# Patient Record
Sex: MALE | Race: AMERICAN INDIAN OR ALASKA NATIVE | NOT HISPANIC OR LATINO
[De-identification: names, ages, dates, MRNs, and addresses within clinical notes are randomized per-mention and may not be internally consistent; named-entity substitution may affect disease eponyms.]

---
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
## 2018-08-30 ENCOUNTER — APPOINTMENT (OUTPATIENT)
Dept: PEDIATRICS | Facility: HOSPITAL | Age: 18
End: 2018-08-30

## 2018-09-24 PROBLEM — J45.909 UNSPECIFIED ASTHMA, UNCOMPLICATED: Chronic | Status: ACTIVE | Noted: 2017-06-06

## 2018-09-24 PROBLEM — F90.9 ATTENTION-DEFICIT HYPERACTIVITY DISORDER, UNSPECIFIED TYPE: Chronic | Status: ACTIVE | Noted: 2017-06-06

## 2018-10-04 ENCOUNTER — APPOINTMENT (OUTPATIENT)
Dept: PEDIATRICS | Facility: HOSPITAL | Age: 18
End: 2018-10-04
Payer: MEDICAID

## 2018-10-04 ENCOUNTER — OUTPATIENT (OUTPATIENT)
Dept: OUTPATIENT SERVICES | Age: 18
LOS: 1 days | End: 2018-10-04

## 2018-10-04 ENCOUNTER — LABORATORY RESULT (OUTPATIENT)
Age: 18
End: 2018-10-04

## 2018-10-04 VITALS
DIASTOLIC BLOOD PRESSURE: 77 MMHG | SYSTOLIC BLOOD PRESSURE: 116 MMHG | HEIGHT: 67.13 IN | HEART RATE: 62 BPM | BODY MASS INDEX: 25.91 KG/M2 | WEIGHT: 167 LBS

## 2018-10-04 DIAGNOSIS — Z72.821 INADEQUATE SLEEP HYGIENE: ICD-10-CM

## 2018-10-04 DIAGNOSIS — Z86.19 PERSONAL HISTORY OF OTHER INFECTIOUS AND PARASITIC DISEASES: ICD-10-CM

## 2018-10-04 DIAGNOSIS — J45.30 MILD PERSISTENT ASTHMA, UNCOMPLICATED: ICD-10-CM

## 2018-10-04 DIAGNOSIS — J45.50 SEVERE PERSISTENT ASTHMA, UNCOMPLICATED: ICD-10-CM

## 2018-10-04 DIAGNOSIS — F90.9 ATTENTION-DEFICIT HYPERACTIVITY DISORDER, UNSPECIFIED TYPE: ICD-10-CM

## 2018-10-04 DIAGNOSIS — J30.89 OTHER ALLERGIC RHINITIS: ICD-10-CM

## 2018-10-04 DIAGNOSIS — Z00.00 ENCOUNTER FOR GENERAL ADULT MEDICAL EXAMINATION W/OUT ABNORMAL FINDINGS: ICD-10-CM

## 2018-10-04 PROCEDURE — 99395 PREV VISIT EST AGE 18-39: CPT | Mod: 25

## 2018-10-04 PROCEDURE — 99213 OFFICE O/P EST LOW 20 MIN: CPT

## 2018-10-04 RX ORDER — CLOTRIMAZOLE 10 MG/G
1 CREAM TOPICAL 3 TIMES DAILY
Qty: 1 | Refills: 0 | Status: COMPLETED | COMMUNITY
Start: 2017-08-29 | End: 2018-10-04

## 2018-10-04 RX ORDER — SELENIUM SULFIDE 25 MG/ML
2.5 LOTION TOPICAL
Qty: 14 | Refills: 0 | Status: COMPLETED | COMMUNITY
Start: 2017-08-29 | End: 2018-10-04

## 2018-10-04 NOTE — PHYSICAL EXAM
[General Appearance - Well Developed] : interactive [General Appearance - Well-Appearing] : well appearing [General Appearance - In No Acute Distress] : in no acute distress [Appearance Of Head] : the head was normocephalic [Sclera] : the conjunctiva were normal [Outer Ear] : the ears and nose were normal in appearance [Both Tympanic Membranes Were Examined] : both tympanic membranes were normal [Nasal Cavity] : the nasal mucosa and septum were normal [Examination Of The Oral Cavity] : the teeth, gums, and palate were normal [Oropharynx] : the oropharynx was normal  [Neck Cervical Mass (___cm)] : no neck mass was observed [Respiration, Rhythm And Depth] : normal respiratory rhythm and effort [Auscultation Breath Sounds / Voice Sounds] : clear bilateral breath sounds [Heart Rate And Rhythm] : heart rate and rhythm were normal [Heart Sounds] : normal S1 and S2 [Murmurs] : no murmurs [Bowel Sounds] : normal bowel sounds [Abdomen Soft] : soft [Abdomen Tenderness] : non-tender [Abdominal Distention] : nondistended [Musculoskeletal Exam: Normal Movement Of All Extremities] : normal movements of all extremities [Motor Tone] : muscle strength and tone were normal [No Visual Abnormalities] : no visible abnormailities [Deep Tendon Reflexes (DTR)] : deep tendon reflexes were 2+ and symmetric [Generalized Lymph Node Enlargement] : no lymphadenopathy [Skin Color & Pigmentation] : normal skin color and pigmentation [] : no significant rash [Skin Lesions] : no skin lesions [Initial Inspection: Infant Active And Alert] : active and alert [Penis Abnormality] : the penis was normal [Scrotum] : the scrotum was normal [Testes Mass (___cm)] : there were no testicular masses

## 2018-10-08 ENCOUNTER — OUTPATIENT (OUTPATIENT)
Dept: OUTPATIENT SERVICES | Age: 18
LOS: 1 days | End: 2018-10-08

## 2018-10-08 ENCOUNTER — LABORATORY RESULT (OUTPATIENT)
Age: 18
End: 2018-10-08

## 2018-10-08 ENCOUNTER — APPOINTMENT (OUTPATIENT)
Dept: PEDIATRICS | Facility: HOSPITAL | Age: 18
End: 2018-10-08
Payer: MEDICAID

## 2018-10-08 DIAGNOSIS — Z11.1 ENCOUNTER FOR SCREENING FOR RESPIRATORY TUBERCULOSIS: ICD-10-CM

## 2018-10-08 DIAGNOSIS — Z23 ENCOUNTER FOR IMMUNIZATION: ICD-10-CM

## 2018-10-08 PROBLEM — Z72.821 POOR SLEEP HYGIENE: Status: ACTIVE | Noted: 2018-10-08

## 2018-10-08 LAB
AMPHET UR-MCNC: NEGATIVE
BARBITURATES UR-MCNC: NEGATIVE
BENZODIAZ UR-MCNC: NEGATIVE
COCAINE METAB.OTHER UR-MCNC: NEGATIVE
CREATININE, URINE: 104.6 MG/DL
METHADONE UR-MCNC: NEGATIVE
METHAQUALONE UR-MCNC: NEGATIVE
MEV IGG FLD QL IA: <5 AU/ML
MEV IGG+IGM SER-IMP: NEGATIVE
OPIATES UR-MCNC: NEGATIVE
PCP UR-MCNC: NEGATIVE
PROPOXYPH UR QL: NEGATIVE
RUBV IGG FLD-ACNC: 2.7 INDEX
RUBV IGG SER-IMP: POSITIVE
THC UR QL: NEGATIVE

## 2018-10-08 PROCEDURE — 99211 OFF/OP EST MAY X REQ PHY/QHP: CPT

## 2018-10-08 NOTE — HISTORY OF PRESENT ILLNESS
[FreeTextEntry6] : LUKE CANTRELL is a 17 year old who was seen last week for WCC.\par In the interim, quantiferon gold was clotted.  He also showed no antibodies present for measles

## 2018-10-08 NOTE — DISCUSSION/SUMMARY
[Normal Growth] : growth [Normal Development] : development  [None] : No known medical problems [No Elimination Concerns] : elimination [No Feeding Concerns] : feeding [No Skin Concerns] : skin [Normal Sleep Pattern] : sleep [Physical Growth and Development] : physical growth and development [Social and Academic Competence] : social and academic competence [Emotional Well-Being] : emotional well-being [Risk Reduction] : risk reduction [Violence and Injury Prevention] : violence and injury prevention [No Medications] : ~He/She~ is not on any medications [Patient] : patient [Parent/Guardian] : parent/guardian [Met privately with the adolescent for part of the office visit?] : Met privately with the adolescent for part of the office visit? Yes [Adolescent demonstrates understanding of his/her conditions and how to take prescribed medications?] : Adolescent demonstrates understanding of his/her conditions and how to take prescribed medications? Yes [Adolescent asks questions during each office  visit and participates in the care plan?] : Adolescent asks questions during each office visit and participates in the care plan? Yes [Adolescent is competent in independently making appointments, filling prescriptions, following up on referrals, and seeking emergency services, as needed?] : Adolescent is competent in independently making appointments, filling prescriptions, following up on referrals, and seeking emergency services, as needed? Yes [Adolescent's caregivers were provided with the opportunity to discuss their concerns about transferring decision making responsibility to the adolescent?] : Adolescent's caregivers were provided with the opportunity to discuss their concerns about transferring decision making responsibility to the adolescent? Yes [Initiated discussion about transfer to an adult healthcare provider.  Provided copy of transition letter?] : Initiated discussion about transfer to an adult healthcare provider.  Provided copy of transition letter? Yes [FreeTextEntry1] : LUKE CANTRELL is a 17 year old who is about to start a home health program. \par Needs verification of immunization titers, which we will obtain today.\par Will need to transition to adult care.

## 2018-10-08 NOTE — DEVELOPMENTAL MILESTONES
[0] : 2) Feeling down, depressed, or hopeless: Not at all (0) [Eats meals with family] : eats meals with family [Mother] : mother [___ Brothers] : [unfilled] brothers [___ Sisters] : [unfilled] sisters [Eats regular meals including adequate fruits and vegetables] : eats regular meals including adequate fruits and vegetables [Has friends] : has friends [At least 1 hour of physical acitvity/day] : at least 1 hour of physical activity/day [Has interests/participates in community activities/volunteers] : has interests/participates in community activities/volunteers [Home is free of violence] : home is free of violence [Has peer relationships free of violence] : has peer relationships free of violence [Sometimes] : sometimes [To Pt Genitalia] : pt genitalia [Has ways to cope with stress] : has ways to cope with stress [Displays self-confidence] : displays self-confidence [Has problems with sleep] : has problems with sleep [[id number]] : 0 [Has concerns about body or appearance] : has no concerns about body or appearance [Uses tobacco/alcohol/drugs] : does not use tobacco/alcohol/drugs [Impaired/Distracted driving] : no impaired/distracted driving [Sexually Active] : The patient is not sexually active [Contraception] : does not use contraception [Gets depressed, anxious, or irritable / has mood swings] : does not get depressed, anxious, or irritable / has no mood swings [Has thoughts about hurting self or considered suicide] : has no thoughts about hurting self or considered suicide [FreeTextEntry6] : Graduated from [employer] June 2018 [FreeTextEntry8] : N [de-identified] : Not currently sexually active, but did have oral sex several months ago

## 2018-10-08 NOTE — HISTORY OF PRESENT ILLNESS
[Mother] : mother [Good] : good [Good Dental Hygiene] : Good [Up to Date] : Up to date [No Nutrition Concerns] : nutrition [No Sleep Concerns] : sleep [No Behavior Concerns] : behavior [No School Concerns] : school [No Developmental Concerns] : development [No Elimination Concerns] : elimination [Diverse, Healthy Diet] : his current diet is diverse and healthy [None] : No significant risk factors are identified [0] : Intermittent - 0   [Daily] : daily [Minor Limitation] : minor limitation  [0-1/yr] : Intermittent - 0-1/yr  [< or = 15] : < than or = 15  [FreeTextEntry1] : LUKE CANTRELL is a 17 year old with severe persistent asthma, ADHD here for a WCC. [FreeTextEntry7] : 10

## 2018-10-08 NOTE — DEVELOPMENTAL MILESTONES
[0] : 2) Feeling down, depressed, or hopeless: Not at all (0) [Eats meals with family] : eats meals with family [Mother] : mother [___ Brothers] : [unfilled] brothers [___ Sisters] : [unfilled] sisters [Eats regular meals including adequate fruits and vegetables] : eats regular meals including adequate fruits and vegetables [Has friends] : has friends [At least 1 hour of physical acitvity/day] : at least 1 hour of physical activity/day [Has interests/participates in community activities/volunteers] : has interests/participates in community activities/volunteers [Home is free of violence] : home is free of violence [Has peer relationships free of violence] : has peer relationships free of violence [Sometimes] : sometimes [To Pt Genitalia] : pt genitalia [Has ways to cope with stress] : has ways to cope with stress [Displays self-confidence] : displays self-confidence [Has problems with sleep] : has problems with sleep [[id number]] : 0 [Has concerns about body or appearance] : has no concerns about body or appearance [Uses tobacco/alcohol/drugs] : does not use tobacco/alcohol/drugs [Impaired/Distracted driving] : no impaired/distracted driving [Sexually Active] : The patient is not sexually active [Contraception] : does not use contraception [Gets depressed, anxious, or irritable / has mood swings] : does not get depressed, anxious, or irritable / has no mood swings [Has thoughts about hurting self or considered suicide] : has no thoughts about hurting self or considered suicide [FreeTextEntry6] : Graduated from [employer] June 2018 [FreeTextEntry8] : N [de-identified] : Not currently sexually active, but did have oral sex several months ago

## 2018-10-16 DIAGNOSIS — Z23 ENCOUNTER FOR IMMUNIZATION: ICD-10-CM

## 2018-10-16 DIAGNOSIS — J30.89 OTHER ALLERGIC RHINITIS: ICD-10-CM

## 2018-10-16 DIAGNOSIS — Z00.00 ENCOUNTER FOR GENERAL ADULT MEDICAL EXAMINATION WITHOUT ABNORMAL FINDINGS: ICD-10-CM

## 2018-10-16 DIAGNOSIS — Z11.1 ENCOUNTER FOR SCREENING FOR RESPIRATORY TUBERCULOSIS: ICD-10-CM

## 2018-10-16 DIAGNOSIS — Z72.821 INADEQUATE SLEEP HYGIENE: ICD-10-CM

## 2018-10-16 DIAGNOSIS — J45.30 MILD PERSISTENT ASTHMA, UNCOMPLICATED: ICD-10-CM

## 2018-10-16 DIAGNOSIS — F90.9 ATTENTION-DEFICIT HYPERACTIVITY DISORDER, UNSPECIFIED TYPE: ICD-10-CM

## 2018-10-16 DIAGNOSIS — J45.50 SEVERE PERSISTENT ASTHMA, UNCOMPLICATED: ICD-10-CM

## 2018-11-12 ENCOUNTER — OUTPATIENT (OUTPATIENT)
Dept: OUTPATIENT SERVICES | Age: 18
LOS: 1 days | End: 2018-11-12

## 2018-11-12 ENCOUNTER — APPOINTMENT (OUTPATIENT)
Dept: PEDIATRICS | Facility: HOSPITAL | Age: 18
End: 2018-11-12
Payer: MEDICAID

## 2018-11-12 DIAGNOSIS — Z23 ENCOUNTER FOR IMMUNIZATION: ICD-10-CM

## 2018-11-12 PROCEDURE — 90716 VAR VACCINE LIVE SUBQ: CPT

## 2018-11-12 PROCEDURE — 90472 IMMUNIZATION ADMIN EACH ADD: CPT

## 2018-11-12 PROCEDURE — 90707 MMR VACCINE SC: CPT

## 2018-11-12 PROCEDURE — 90471 IMMUNIZATION ADMIN: CPT
